# Patient Record
Sex: MALE | Race: ASIAN | NOT HISPANIC OR LATINO | Employment: FULL TIME | URBAN - METROPOLITAN AREA
[De-identification: names, ages, dates, MRNs, and addresses within clinical notes are randomized per-mention and may not be internally consistent; named-entity substitution may affect disease eponyms.]

---

## 2019-02-14 ENCOUNTER — OFFICE VISIT - HEALTHEAST (OUTPATIENT)
Dept: FAMILY MEDICINE | Facility: CLINIC | Age: 29
End: 2019-02-14

## 2019-02-14 DIAGNOSIS — Z00.00 ROUTINE GENERAL MEDICAL EXAMINATION AT A HEALTH CARE FACILITY: ICD-10-CM

## 2019-02-14 DIAGNOSIS — R63.4 WEIGHT LOSS: ICD-10-CM

## 2019-02-14 DIAGNOSIS — F34.1 DYSTHYMIA: ICD-10-CM

## 2019-02-14 DIAGNOSIS — Z13.220 LIPID SCREENING: ICD-10-CM

## 2019-02-14 LAB
CHOLEST SERPL-MCNC: 189 MG/DL
FASTING STATUS PATIENT QL REPORTED: NORMAL
HDLC SERPL-MCNC: 76 MG/DL
LDLC SERPL CALC-MCNC: 101 MG/DL
T4 FREE SERPL-MCNC: 1.3 NG/DL (ref 0.7–1.8)
TRIGL SERPL-MCNC: 62 MG/DL
TSH SERPL DL<=0.005 MIU/L-ACNC: 1.76 UIU/ML (ref 0.3–5)

## 2019-02-14 ASSESSMENT — MIFFLIN-ST. JEOR: SCORE: 1467.69

## 2019-02-15 ENCOUNTER — COMMUNICATION - HEALTHEAST (OUTPATIENT)
Dept: FAMILY MEDICINE | Facility: CLINIC | Age: 29
End: 2019-02-15

## 2019-02-19 ENCOUNTER — COMMUNICATION - HEALTHEAST (OUTPATIENT)
Dept: FAMILY MEDICINE | Facility: CLINIC | Age: 29
End: 2019-02-19

## 2019-03-04 ENCOUNTER — OFFICE VISIT - HEALTHEAST (OUTPATIENT)
Dept: BEHAVIORAL HEALTH | Facility: CLINIC | Age: 29
End: 2019-03-04

## 2019-03-04 DIAGNOSIS — F33.0 MAJOR DEPRESSIVE DISORDER, RECURRENT, MILD (H): ICD-10-CM

## 2019-03-20 ENCOUNTER — OFFICE VISIT - HEALTHEAST (OUTPATIENT)
Dept: BEHAVIORAL HEALTH | Facility: CLINIC | Age: 29
End: 2019-03-20

## 2019-03-20 DIAGNOSIS — F33.0 MAJOR DEPRESSIVE DISORDER, RECURRENT, MILD (H): ICD-10-CM

## 2019-03-21 ENCOUNTER — AMBULATORY - HEALTHEAST (OUTPATIENT)
Dept: BEHAVIORAL HEALTH | Facility: CLINIC | Age: 29
End: 2019-03-21

## 2019-03-26 ENCOUNTER — OFFICE VISIT - HEALTHEAST (OUTPATIENT)
Dept: BEHAVIORAL HEALTH | Facility: CLINIC | Age: 29
End: 2019-03-26

## 2019-03-26 ENCOUNTER — AMBULATORY - HEALTHEAST (OUTPATIENT)
Dept: BEHAVIORAL HEALTH | Facility: CLINIC | Age: 29
End: 2019-03-26

## 2019-03-26 DIAGNOSIS — F33.0 MAJOR DEPRESSIVE DISORDER, RECURRENT, MILD (H): ICD-10-CM

## 2019-04-08 ENCOUNTER — OFFICE VISIT - HEALTHEAST (OUTPATIENT)
Dept: BEHAVIORAL HEALTH | Facility: CLINIC | Age: 29
End: 2019-04-08

## 2019-04-08 DIAGNOSIS — F33.0 MAJOR DEPRESSIVE DISORDER, RECURRENT, MILD (H): ICD-10-CM

## 2019-04-30 ENCOUNTER — OFFICE VISIT - HEALTHEAST (OUTPATIENT)
Dept: BEHAVIORAL HEALTH | Facility: CLINIC | Age: 29
End: 2019-04-30

## 2019-04-30 DIAGNOSIS — F33.0 MAJOR DEPRESSIVE DISORDER, RECURRENT, MILD (H): ICD-10-CM

## 2019-05-09 ENCOUNTER — AMBULATORY - HEALTHEAST (OUTPATIENT)
Dept: BEHAVIORAL HEALTH | Facility: CLINIC | Age: 29
End: 2019-05-09

## 2019-05-14 ENCOUNTER — OFFICE VISIT - HEALTHEAST (OUTPATIENT)
Dept: BEHAVIORAL HEALTH | Facility: CLINIC | Age: 29
End: 2019-05-14

## 2019-05-14 DIAGNOSIS — F33.0 MAJOR DEPRESSIVE DISORDER, RECURRENT, MILD (H): ICD-10-CM

## 2019-06-29 DIAGNOSIS — Z11.3 SCREENING EXAMINATION FOR VENEREAL DISEASE: Primary | ICD-10-CM

## 2019-10-14 ENCOUNTER — AMBULATORY - HEALTHEAST (OUTPATIENT)
Dept: BEHAVIORAL HEALTH | Facility: CLINIC | Age: 29
End: 2019-10-14

## 2020-03-11 ENCOUNTER — HEALTH MAINTENANCE LETTER (OUTPATIENT)
Age: 30
End: 2020-03-11

## 2021-01-03 ENCOUNTER — HEALTH MAINTENANCE LETTER (OUTPATIENT)
Age: 31
End: 2021-01-03

## 2021-04-25 ENCOUNTER — HEALTH MAINTENANCE LETTER (OUTPATIENT)
Age: 31
End: 2021-04-25

## 2021-05-27 NOTE — PROGRESS NOTES
Outpatient Mental Health Treatment Plan    Name:  Leo Long  :  1990  MRN:  [id number]    Treatment Plan:  Initial Treatment Plan  Intake/initial treatment plan date:  3/26/19  Benefit and risks and alternatives have been discussed: Yes  Is this treatment appropriate with minimal intrusion/restrictions: Yes  Estimated duration of treatment:  Approximately 8-10 sessions.  Anticipated frequency of services:  Every 1-2 weeks  Necessity for frequency: This frequency is needed to establish therapeutic goals and for continuity of care in order to monitor progress.  Necessity for treatment: To address cognitive, behavioral, and/or emotional barriers in order to work toward goals and to improve quality of life.    Session Type: Patient is presenting for an Individual session.    Plan:      ? Depression    Goal:  Decrease average depression level from 3/4 to 1/4.   Strategies:    ?[x] Decrease social isolation     [x] Increase involvement in meaningful activities     ?[x] Discuss sleep hygiene     ?[x] Explore thoughts and expectations about self and others     ?[x] Process grief (loss of significant person, independence, role, etc.)     ?[x] Assess for suicide risk     ?[x] Implement physical activity routine (with physician approval)     [x] Consider introduction of bibliotherapy and/or videos     [x] Continue compliance with medical treatment plan (or explore barriers)       ?  ?Degree to which this is a problem: 3  Degree to which goal is met: goal established and in progress.  Date of Review: in 3 months.       Functional Impairment:  1=Not at all/Rarely  2=Some days  3=Most Days  4=Every Day    Personal : 3  Family : 1   Social : 2   Work/school : 2    Diagnosis:  Major depressive disorder, recurrent, mild    WHODAS 2.0 12-item version   9    Scores presented in qualifiers to represent level of disability.  MILD Problem (slight, low, ...) 5-24%    H1= 20  H2= 0  H3= 15    Clinical assessments and measures  completed:. HARPER-7, PHQ-9 and CAGE-AID and CSSR-S     Strengths:  Leo has many strengths, including intelligence, empathy, creativity, and loyalty in relationship.  Limitations:  Leo considers that he struggles with a tendency to over-think and devaluing himself.    Cultural Considerations: Leo grew up in [facility]. His childhood was impacted by a father who had serious health issues.     Persons responsible for this plan: Patient            Psychotherapist Signature           Patient Signature:              Guardian Signature             Provider: Performed and documented by JENN Koo   Date:  3/26/2019

## 2021-05-27 NOTE — PROGRESS NOTES
Mental Health Visit Note    3/26/2019    Start time: 5:15 PM    Stop Time: 6:14 PM   Session # 2    Session Type: Patient is presenting for an Individual session.    Leo Long is a 29 y.o. male is being seen today for    Chief Complaint   Patient presents with     Follow-up     Depression     grief/loss, overthinking tendency   .     New symptoms or complaints: Reported no new symptoms, nor concerns.    Functional Impairment:   Personal: 2  Family: 1  Work: 2  Social:2    Clinical assessment of mental status: Unchanged from 3/21/19: Leo presented on time for his appointment.  He was oriented x3, open and cooperative, and dressed appropriately for this session and weather. His memory represented normal cognitive functioning. His speech was within normal limits. Language was appropriate to discussion. Concentration and focus is WNL. Psychosis is not noted, nor reported. His mood is mildly dysphoric. Affect is mood-congruent. Fund of knowledge is adequate. Insight is adequate for therapy.    Suicidal/Homicidal Ideation present: None Reported This Session    Patient's impression of their current status: Patient discussed a tendency he has noticed of over-thinking and recognized some of the early experiences in his life that shaped this tendency.  He considered where he recently ruminated over his last relationship and being unfair and hard on himself.  He noticed he felt better last weekend as he tended to his cat who is having some health issues. He also considered it helped his outlook to be busier at work this week.  He explored expectation of self.  He is authoring the values that are most meaningful to him (health, relationship, healing, wellbeing, family, community, creativity) and the behaviors that serve them.    Therapist impression of patients current state: Bimal is engaging in the therapeutic process.  His thoughts, feelings and beliefs were processed. He is willing to explore and apply CBT and ACT  "strategies for improved symptom management. He has started reading a book recommended at intake, Mark Garcia' The Happiness Trap and does thought exercises between sessions.  He is also open to exploring profound spheres of influence in his life, notably his family system.  He agreed to safety planning in the presence of suicidal ideation - agreeing to reach out to reach out to his cousin, and seek emergency care if he experiences feeling unsafe/at risk of self harm.  He is insightful.      Type of psychotherapeutic technique provided: Insight oriented, Client centered, Solution-focused, CBT and ACT (Acceptance Commitment Therapy)    Progress toward short term goals:Progress as expected, patient is receptive to CBT,  ACT and mindfulness strategies for improved symptom management.    Review of long term goals: Treatment Plan updated    Diagnosis:   1. Major depressive disorder, recurrent, mild (H)        Plan and Follow up: Patient plans to nurture healthy routines, including physical activity and nurturing a diaphragmatic breathing/mindfulness practice.  He plans to notice his thoughts and feelings with a spirit of curiosity and acceptance, as well as, apply ACT thought defusion techniques in the presence of aversive thoughts and feelings.  He also plans to situations when he is over-thinking and what feelings he may be protecting himself from feeling. He plans to do an ACT thought exercise, \"Getting Unhooked.\"  He plans to continue to read Mark Garcia \"The Happiness Trap.\"  He plans to attend an artistic event (identified as an interest - carryover goal).  Agrees to safety planning in the presence of self destructive thoughts.  He plans to return  for follow up in 2 weeks.      Discharge Criteria/Planning: Patient will continue with follow-up until therapy can be discontinued without return of signs and symptoms.    Juliet Johnson 3/26/2019  "

## 2021-05-27 NOTE — PROGRESS NOTES
"Mental Health Visit Note    4/8/2019    Start time: 5:06 PM    Stop Time: 6:05 PM   Session # 3    Session Type: Patient is presenting for an Individual session.    Leo Long is a 29 y.o. male is being seen today for    Chief Complaint   Patient presents with     Follow-up     Depression     worry/concern for loved one   .     New symptoms or complaints: None    Functional Impairment:   Personal: 2  Family: 2  Work: 1  Social:2    Clinical assessment of mental status: Leo presented on time for his appointment.  He was oriented x3, open and cooperative, and dressed appropriately for this session and weather. His memory represented normal cognitive functioning. His speech was within normal limits. Language was appropriate to discussion. Concentration and focus is WNL. Psychosis is not noted, nor reported. His mood is euthymic. Affect is mood-congruent. Fund of knowledge is adequate. Insight is adequate for therapy.    Suicidal/Homicidal Ideation present: None Reported This Session    Patient's impression of their current status: Patient stated, \"I've been doing pretty good, been feeling more content,\" as he elaborated on a new dating relationship that has been a source of lenny.  He recognized traits in the person he has started dating that he admires and resonates with, including open-mindedness, intelligence, and skilled communicator.  He considered that work has been better lately because he has been busier.  He stated, \"I haven't noticed over-thinking things lately.\"  He discussed efforts he is making to engage in healthy activities - including fitness and his photography hobby.  He discussed concern for his cousin who is struggling with mental health issues.  He considered ways he can continue to be supportive toward him.  He explored expectation of self.  He is authoring the values that are most meaningful to him (health, relationship, wellbeing, work, family, creativity) and the behaviors that serve " "them.    Therapist impression of patients current state: Bimal is engaging in the therapeutic process.  His thoughts, feelings and beliefs were processed. He appeared less dysphoric in today's session and appears to be benefiting from engaging in healthy routines, including engaging in value-based activities. He also remains open to exploring and applying CBT and ACT strategies for improved symptom management. He is reading aMrk Garcia' The Happiness Trap and does thought exercises between sessions.  He is receptive to exploring profound spheres of influence in his life. He is insightful.      Type of psychotherapeutic technique provided: Insight oriented, Client centered, Solution-focused, CBT and ACT (Acceptance Commitment Therapy)    Progress toward short term goals:Progress as expected, patient is receptive to CBT,  ACT and mindfulness strategies for improved symptom management.    Review of long term goals: Patient is making progres on his long-term goals.  Treatment Plan was completed on 3/26/19.    Diagnosis:   1. Major depressive disorder, recurrent, mild (H)        Plan and Follow up: Patient plans to nurture healthy routines, including physical activity and nurturing a diaphragmatic breathing/mindfulness practice.  He plans to continue to notice his thoughts and feelings with a spirit of curiosity and acceptance, as well as, apply ACT thought defusion techniques in the presence of aversive thoughts and feelings.  He also plans to situations when he is over-thinking and what feelings he may be protecting himself from feeling. He plans to continue to read Mark Garcia \"The Happiness Trap.\"  Agrees to safety planning in the presence of self destructive thoughts.  He plans to return  for follow up in 3 weeks.      Discharge Criteria/Planning: Patient will continue with follow-up until therapy can be discontinued without return of signs and symptoms.    Juliet Johnson 4/8/2019  "

## 2021-05-28 NOTE — PROGRESS NOTES
Returned call from patient with request to schedule a follow up.  He requested a late in the day appointment due to his work schedule.  Provided a 5PM follow up appointment on Tuesday, 5/14/19.  He expressed appreciation.  Wished him well.

## 2021-05-28 NOTE — PROGRESS NOTES
"Mental Health Visit Note    4/30/2019    Start time: 5:04 PM    Stop Time: 5:59 PM   Session # 4    Session Type: Patient is presenting for an Individual session.    Leo Long is a 29 y.o. male is being seen today for    Chief Complaint   Patient presents with     Follow-up     Anxiety     relationship issues, job stress   .     New symptoms or complaints: Patient reported no new symptoms, nor concerns.    Functional Impairment:   Personal: 2  Family: 1  Work: 1  Social:2    Clinical assessment of mental status: Leo presented on time for his appointment.  He was oriented x3, open and cooperative, and dressed appropriately for this session and weather. His memory represented normal cognitive functioning. His speech was within normal limits. Language was appropriate to discussion. Concentration and focus is WNL. Psychosis is not noted, nor reported. His mood is euthymic. Affect is mood-congruent. Fund of knowledge is adequate. Insight is adequate for therapy.    Suicidal/Homicidal Ideation present: None Reported This Session    Patient's impression of their current status: Patient stated, \"I feel pretty good,\" as he elaborated on a stressful work situation improving and continuing to enjoy a new dating relationship.  He described feeling somewhat nervous about meeting new people as his girlfriend has been introducing him to her friends.  He considered where he may struggle with being over-focused on how he is being perceived by others, especially meeting new people.  He acknowledged feeling generally better these last couple months, and considered the positive influence of meeting someone he likes and his work situation gradually improving.  He discussed concern for his friend who is having a difficult time adjusting to a medication.  He considered ways he can continue to be supportive toward him without feeling responsible.  He explored expectation of self and other.  He is authoring the values that are most " "meaningful to him (health, relationship, wellbeing, rewarding work, empathy, creativity) and the behaviors that serve them.    Therapist impression of patients current state: Bimal is engaging in the therapeutic process.  His thoughts, feelings and beliefs were processed. He appears with an improved mood in recent sessions.  He appears to be benefiting from nurturing healthy routines, including using [employer] keisha. He is exploring and applying CBT and ACT strategies for improved symptom management in and out of sessions.  He has experienced suicidal ideation in a couple months, and agrees to safety plan of reaching out to his cousin and/or friend in the presence of self destructive thoughts, and seeking emergency care if he experiences feeling unsafe toward self.  He is enjoying a new relationship, a source of connection and lenny. He is insightful.      Type of psychotherapeutic technique provided: Insight oriented, Client centered, Solution-focused, CBT and ACT (Acceptance Commitment Therapy)    Progress toward short term goals:Progress as expected, patient is making excellent progress on his short-term goals.     Review of long term goals: Patient is making progres on his long-term goals.  Treatment Plan was completed on 3/26/19.    Diagnosis:   1. Major depressive disorder, recurrent, mild (H)        Plan and Follow up: Patient plans to continue to nurture healthy routines, including physical activity and nurturing a diaphragmatic mindfulness practice.  He plans to continue to notice his thoughts and feelings with a spirit of curiosity and acceptance, as well as, apply ACT thought defusion techniques in the presence of aversive thoughts and feelings.  He also plans to continue to notice when he is over-thinking and what feelings he may be protecting himself from feeling in those situations.  Recommended writing in a journal as an outlet for thoughts and feelings. He plans to finish reading Mark Garcia \"The " "Happiness Trap.\"  Agrees to safety planning in the presence of self destructive thoughts.  He plans to return  for follow up next month, or as needed.  He shared that he has a limited number of therapy sessions covered by his insurance and he plans to schedule a follow up when he notices he is struggling more with symptoms.         Discharge Criteria/Planning: Patient will continue with follow-up until therapy can be discontinued without return of signs and symptoms.    Juliet Johnson 4/30/2019  "

## 2021-05-28 NOTE — PROGRESS NOTES
"Mental Health Visit Note    5/14/2019    Start time: 5:05 PM    Stop Time: 6:01 PM   Session # 5    Session Type: Patient is presenting for an Individual session.    Leo Long is a 29 y.o. male is being seen today for    Chief Complaint   Patient presents with     Follow-up     Depression     grief/loss issues   .     New symptoms or complaints: None    Functional Impairment:   Personal: 2  Family: 1  Work: 1  Social:2    Clinical assessment of mental status: Leo presented on time for his appointment.  He was oriented x3, open and cooperative, and dressed appropriately for this session and weather. His memory represented normal cognitive functioning. His speech was within normal limits. Language was appropriate to discussion. Concentration and focus is WNL. Psychosis is not noted, nor reported. His mood is mildly dysphoric. Affect is euthymic. Fund of knowledge is adequate. Insight is adequate for therapy.    Suicidal/Homicidal Ideation present: None Reported This Session    Patient's impression of their current status: Patient discussed a recent relationship break up, something he acknowledged he has spent a lot of time \"reminiscing about the good times\" in his thoughts.  He stated, \"Today might be the best day since last week,\" as he elaborated on feeling more hopeful.  He acknowledged he has connected with a group of men who play board games, and intends to continue to connect with them even though they are also friends with his ex. He has also been spending time with other friends and coworkers. He has started running again, as well.  He recognized the ways he can continue to nurture healthy routines to help him not get stuck in his feelings.  He explored expectation of self and other.  He is authoring the values that are most meaningful to him (health, relationship, meaningful connection with others, wellbeing, rewarding work, empathy, creativity) and the behaviors that serve them.    Therapist " "impression of patients current state: Bimal is engaging in the therapeutic process.  His thoughts, feelings and beliefs were processed. He continues to benefit from nurturing healthy routines, which is helping him navigate grief feelings in the context of his recent relationship break up. He is open to exploring and applying CBT and ACT strategies for improved symptom management in and out of sessions.  He has not experienced suicidal ideation in a couple months, and agrees to safety plan of reaching out to his cousin and/or friend in the presence of self destructive thoughts, and seeking emergency care if he experiences feeling unsafe toward self.  He is insightful.      Type of psychotherapeutic technique provided: Insight oriented, Client centered, Solution-focused, CBT and ACT (Acceptance Commitment Therapy)    Progress toward short term goals:Progress as expected, patient is making excellent progress on his short-term goals.     Review of long term goals: Patient is making progres on his long-term goals.  Treatment Plan was completed on 3/26/19.    Diagnosis:   1. Major depressive disorder, recurrent, mild (H)        Plan and Follow up: Patient plans to continue to nurture healthy routines, including physical activity, connecting with others, and nurturing a diaphragmatic mindfulness practice.  He plans to continue to notice his thoughts and feelings with a spirit of curiosity and acceptance, and, apply ACT thought defusion techniques in the presence of aversive/unhelpful thoughts and feelings.  He also plans to continue to notice when he is over-thinking, and engage in a healthy behavior as a distraction/to help him regain persective.  He plans to finish reading Mark Garcia \"The Happiness Trap.\"  Agrees to safety planning in the presence of self destructive thoughts.  He plans to return  for follow up in 2 weeks, or as needed.      Discharge Criteria/Planning: Patient will continue with follow-up until therapy " can be discontinued without return of signs and symptoms.    Juliet Johnson 5/14/2019

## 2021-06-02 VITALS — WEIGHT: 120.4 LBS | BODY MASS INDEX: 18.25 KG/M2 | HEIGHT: 68 IN

## 2021-06-02 NOTE — PROGRESS NOTES
Contacted patient at number in epic, [phone], and left discrete message with encouragement to return call to discuss scheduling, and wished him well.    Plan: assist patient with scheduling follow up, as needed.

## 2021-06-24 NOTE — PATIENT INSTRUCTIONS - HE
Check into insurance coverage of full spectrum light    If you have a date for tetanus vaccine , please let us know

## 2021-06-24 NOTE — PROGRESS NOTES
"Diagnostic Assessment    [] Brief  ?[x] Standard    Date(s): 2019  Start Time: 8:04 AM  Stop Time: 9:25 AM    Patient Name: Leo Long  Age: 29 y.o.       1990    Referral Source: PCP, Dr. Kesha Silva MD  Therapist: RHONDA Hernandez, VICKI                                                                                    Persons Present: Leo Ethan and VICKI Hernandez     Session Type: Patient is presenting for an Individual session.       Chief Complaint  \"I'm making my way through a depressive episode. I've been kind of up and down the last 3 weeks.\"  Patient acknowledged a history of depression.     Patient s expectation for treatment   \"Better coping strategies to minimize effects of depressive episodes, especially during relationship challenges.\"  He added, \"How to not over-think things, especially in relationship.\"     Sources/references used in completing this assessment:   Face-to-face interview, review of patient's chart, adult intake questionnaire, and psychological inventories.    Presenting Problem/History:    Functional impairments:   Personal: 3  Family: 1  Work: 2  Social: 2     How does the presenting problem affect patients daily functioning:  Over-thinking, rumination, concentration difficulty, sometimes forgetful, difficulty falling asleep, diminished appetite, occasionally feeling \"an overwhelming sadness.\"     Issues/Stressors: Recent relationship break-up, winter, moved to [place] in 2018, miss friends and family in [place], stressful work environment (in the sense that there are slow periods at work where he over-thinks).      Please select all that apply:    Physical Problems: Sleeping too much, Decreased energy and Decreased appitite    Social Problems: Communications problems, Distrust of others, Unstable relationships, Loss of interest in activities and Sexual difficulties    Behavioral Problems: Reckless and Subtance abuse    Cognitive Problems: " Distractability, Poor attention, Indecisiveness, Poor memory, Racing thoughts and Procrastination    Emotional Problems: Apathetic, Depressed mood and Lack of self confidence       Onset/Frequency/Duration presenting problem symptoms:  Approximately 3 years with depressive episodes occurring in winter and occasionally in summer months.    How does the patient perceive his problem in relation to how others see his problem?  He considered that his closest friend and cousin know him well and are aware when he struggles with depressive episodes.  He considers that his coworkers are aware, and he acknowledges being open on social media about his experience as it can be a source of support from others - he also mentioned a desire to reduce stigma of mental health.     Family/Social History:    Marriages/Significant other   Currently single.  Recently experienced a break up with his girlfriend of 3 months.      Children   Reported none.    Parents   Parents were  about 35 years. His father  in .  His mother lives in [place].      Siblings   Reported no siblings.    Climate in family of origin   Bimal grew up in the [facility].  He was an only child.  He spent a lot of time alone. His father was frequently hospitalized with health issues related to heart disease.  He remembers spending a great deal of time in hospital cafeterias after school, then, as he became a little older, he would often be home alone at the end of the school day.  He described his father as strict and was not close with him.  He was closer to his mother. However, neither parents showed much affection toward him.     Education  Master's degree in Clinical Psychology.      Problems with Learning or School   Reported experiencing depression as a sophomore in high school which impacted his grades.    Developmental factors  Bimal met his developmental milestones.    Significant personal relationships including patient s evaluation of the  relationship quality  Bimal identified having close, long-standing friendships and being close to his cousin - all of whom live in his native [place].  He also has a couple friends in [place] who he has come to know through his graduate program.    Significant life events  Bimal reflected on an experience of being in the 5th grade and a party being planned for his dad's 50th birthday.  He was not invited to attend, and spent the night at his cousin's.  He remembers that his parents did not discuss this with him and he did not talk about his feelings with them.  He also reflected on a relationship break up during a semester in college which had a significant impact on him.  He had thought that everything was going fine and his girlfriend abruptly cut off contact with him.  As he looks back on the experience, he considers that he was 'too needy.'      Sexual/physical/emotional/financial abuse/traumatic event. Reported none.    PTSD Symptoms  No        Contextual Non-personal factors contributing to the patients concerns  Reported none.    Strengths/personal resources  Empathetic, creative, independent, loyal in relationship.    Weaknesses   Lacking confidence, devaluing of self, tendency to over-think, overly conscientious.     Support network(s)/Resources    Reported no formal support resources.     Belief system:    Reported none.    Cultural influences and impact on patient   Bimal reflected on growing up with a father who struggled with serious health issues.  He saw that his father was not afraid of dying, which has shaped his view of mortality.  He also reflected on his father's life shaping influences as he was born in [place] and sent to boarding school in [place], spending much of his childhood  from his parents, and how this impacted his attachment style as a parent.      Cultural impact on health and health care  Seeks and adheres to standard medical care.      Current living situation  Lives  "in a 1-bedroom apartment.  Has a pet cat.    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     Financial Concerns   Reported none.    Legal Problems   Reported none.    Hobbies/Interests:    Photography, video games, social drinking.      Family Mental Health/Medical History:    Family Mental Health:   Reported none.    Family history of Suicide:  Reported none.    Family history Chemical Dependency:  Alcohol abuse - aunt.    Family Medical history:  Diabetes, heart disease, high blood pressure, high cholesterol - father.      Patient Medical History:      Hospitalizations    Reported none.     Medical diagnoses/concerns:  Reported none.    Current physician/other non psychiatric medical provider's:    Kesha Silva MD    Date of last medical exam:  2/14/19    Current Medications:   No current outpatient medications on file prior to visit.     No current facility-administered medications on file prior to visit.          Past Mental Health History:    Previous mental health diagnosis:  Reported none.    Date of diagnosis:  Reported none.    Hx of Mental Health Treatment or Services:  None.    [facility] Received:     No      Hx of MH Tx/Hospitalizations   Reported none.    Hx of Psychiatric Medications:  Reported none.      Suicidal/Homicidal Risk Assessment:    Suicidal: Intent: low  Ideation:Passive He reports a long history of passive suicidal ideation.  He has had the thought a couple weeks ago while driving in traffic \"What if I got out of the car and ran over the bridge,\" with thoughts about whether he would hit ice and how persons in traffic would respond.  He stated, \"I wouldn't do it.\"  He recalls the first time he had a suicidal thought as a sophomore in high school in about 2005 when he considered cutting his wrist in the context of a conflict with his father who refused to allow him to keep a gift his mother had purchased for him " because of his views on computer games.   History of Past Attempt(s): description: Reported no history of attempts.   Crisis Plan: Patient agrees to safety plan and commits to seeking safety if he becomes unsafe in the presence of self destructive thoughts and urges.  He has an agreement with his cousin who he is close to, to talk to him.    Homicidal: None reported   Ideation:Reported none.  History of Aggression towards others: Reported none.  Crisis Plan: Crisis Plan not developed as patient does not present risk factors.    History of destruction to property:  Description: Reported none.  Crisis Plan: Reported none.    Non- Substance Abuse Addictive Behaviors/Compulsive Behaviors:  None Reported    Comments:   Reported none.    Chemical Use/Abuse History:    CAGE-AID (screening to determine a patients use/abuse/dependency):   2/4      Alcohol:  Yes  Type:Beer, whiskey               Frequency: Weekly, approximately 6 drinks/week  Age of first use: 15                        Date of last use: 2/18/19                                                                          Street Drugs:  Yes  Type:marijuana edibles               Frequency: Occasionally, 5 mg THC  Age of first use: 24                         Date of last use: 2/23/19    Prescription Drugs:  None Reported  Type: No history of prescription drug abuse.                 Tobacco:  None Reported  Type:No history of tobacco use.                 Caffeine:  Yes  Type: Coffee, tea, caffeine pills               Frequency: Daily, up to 200 mg  Age of first use: 18, regularly by age 26                         Date of last use: 3/1/19    Currently in a treatment program: No     Where: No history of substance abuse treatment.    [facility] Received: No          Collaborative info requested/received: No       Comments: Bimal reported a history of binge drinking in college.  He indicated he has stopped drinking on 2/18/19 after noticing his drinking had increased in  "January.      History of CD Treatment:      None reported             Description: No history of CD Treatment.    Mental Status Evaluation:    Grooming: Meticulous  Attire: Appropriate  Age: Appears Stated  Behavior Towards Examiner: Cooperative  Motor Activity: Within normal   Eye Contact: Appropriate  Mood: Depressed  Affect: Congruent w/content of speech  Speech/Language: Within normal  Attention: Within normal  Concentration: Within normal  Thought Process: Within normal  Thought Content: Within noramlWithin normal  Orientation: X 3No Evidence of Impairment  Memory: No Evidence of Impairment  Judgement: No Evidence of Impairment  Estimated Intelligence: Above Average  Demonstrated Insight: Adequate  Fund of Knowledge: adequate       Clinical Impressions/Assessment/Recommendations:     Leo \"Bimal\"Ethan provided background information via the Adult Intake Questionnaire, psychological measures and face-to-face interview. [facility] medical records were consulted to complete this DA. The patient was referred to this therapist by his primary care doctor, Dr. Kesha Silva on 2/14/19.     Bimal is a 29 y.o. male presenting to therapy for assistance with depression and anxiety symptoms. Bimal reports a history of depression dating back to adolescence.  He considered that there may be a seasonal effect with some of his depressive episodes. He has also correlated two episodes with relationship break ups, including his current one as he has recently experienced the end of a dating relationship.  He stated, \"I'm making my way through a depressive episode. I've been kind of up and down the last 3 weeks.\" Bimal has attempted to eliminate or manage his symptoms by experiential avoidance and expresses an interest in engaging in psychotherapy at this time with a goal of learning \"better coping strategies to minimize effects of depressive episodes, especially during relationship challenges.\"     Social stressors: Recent ending of " "a dating relationship, stressful work environment (\"leaves me a lot of time to over-think\"), recent move to [place] (Oct. 2018), miss friends and family, minimal local support system.  Bimal presents important strengths: intelligence, history of academic achievement, stable employment in his field, and long-standing friendships.    Based on the information gathered in this diagnostic assessment, the patient meets criteria for the following DSM-V diagnosis, Major Depressive disorder, recurrent, mild as supported by his experience of the following symptoms which have been present during the same 2-week period and represent a change from previous functioning: depressed mood, anhedonia, sleep disturbance (difficulty falling asleep), diminished ability to concentrate (characterized by distractibility, attention issues, and indecisiveness), and recurrent suicidal ideation without a specific plan. The symptoms cause clinically significant distress and are not attributable to the physiological effects of a substance or another medical condition.    Diagnosis:  Major Depressive Disorder, recurrent, mild    It is recommended that Bimal begin individual psychotherapy with follow-up appointments scheduled weekly or bi-weekly.      Bimal would be best serviced by therapeutic interventions that provide a client centered atmosphere with positive regard. In addition, he may benefit from cognitive behavioral therapies, along with Motivational Interviewing.    WHODAS 2.0 12-item version: 9 (18.75%)    Scores presented in qualifiers to represent level of disability.  MILD Problem (slight, low, ...) 5-24%    H1= 20  H2= 0  H3= 15    Assessment of client resolving presenting mental health concerns:    Ability: average. His work hours: 8-4:30 PM present a barrier.  Provider opened up a couple 5PM appointments for follow up.   Motivation: high  Willingness: high      Initial Therapy Plan    1. Return to therapy to discuss outcome of " diagnostic assessment and create a treatment plan.    2. Develop therapeutic relationship with therapist.     3. CBT and other Mindfulness-based approaches, including Acceptance Commitment Therapy (ACT) for depression symptoms.    4. Motivational Interviewing to increase patient's therapeutic engagement and evoke motivation to make positive change.     Is patient's family involved in the treatment?  No     If yes, How?  Bimal is interested in individual therapy sessions.    If no, Why?  If Bimal wishes to include a loved one in future sessions, his change in preference will be honored.

## 2021-06-24 NOTE — PROGRESS NOTES
MALE PREVENTATIVE EXAM    Assessment and Plan:       1. Routine general medical examination at a health care facility  Generally well man    2. Dysthymia  Has good self care, but this is not enough  - Ambulatory referral to Psychology    3. Weight loss  Rule out thyroid disorder  - Thyroid Stimulating Hormone (TSH)  - T4, Free    4. Lipid screening  - Lipid Dawson FASTING        Next follow up:  Return in about 1 year (around 2/14/2020) for Annual physical, or earlier as needed.    Immunization Review  Adult Imm Review: No immunizations due today      I discussed the following with the patient:   Adult Healthy Living: Healthy nutrition  Getting adequate sleep  Stress management  I have had an Advance Directives discussion with the patient.   High protein snacks on hand for low blood sugar    Subjective:   Chief Complaint: Leo Long is an 29 y.o. male here for a preventative health visit.     HPI:  Leo is originally from [place] and came her to attend [employer].  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    We took time to review his history    1) food allergies: Walnuts when younger - ichthy throat - can probably eat now.  He also has oral allergy symptoms with cantaloupe, avocado , pineapple    2) symptoms of low blood sugar: For the last couple of years has found that blood sugar gets low easily  - 3-4 hours after a mal feels weak and shaky - keeps a vial of glucose tablet on me - trying to focus on eating snacks throughout the day.  Had thyroid checked over 10 years ago    3) Has a hard time gaining weight - over the past 1 - 1.5 years he has lost weight - eating less because he was in school and exercising more    4) Depression - comes and goes - notices that it usually - for last 3 years - falling in line with the seasons.  This year started up a month ago and comes and goes.  Interested in psychotherapy, No childhood  abuse, not close to parents. He was isolated/ left alone frequently because Dad had medical needs and had to go out to rehab - or home alone since 5th grade.    He is starting to do mindfulness practice.  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 No  Do you typically exercising at least 40 min, 3-4 times per week?  Yes - 3x/week - body weights - curls, shoulder press, squats, push ups, pull ups  Do you usually eat at least 4 servings of fruit and vegetables a day, include whole grains and fiber and avoid regularly eating high fat foods? NO  - working on that  Have you had an eye exam in the past two years? Yes  Do you see a dentist twice per year? NO  - working on it  Do you have any concerns regarding sleep? No    Safety Screen  If you own firearms, are they secured in a locked gun cabinet or with trigger locks? The patient does not own any firearms  Do you feel you are safe where you are living?: Yes (2/14/2019  9:04 AM)  Do you feel you are safe in your relationship(s)?: Yes (2/14/2019  9:04 AM)      Review of Systems:    Constitutional: negative for recent illness or change in weight; low body weight  Eyes: negative for irritation and vision change  Ears, nose, mouth, throat, and face: negative for nasal congestion and sore throat  Respiratory: negative for cough and dyspnea on exertion  Cardiovascular: negative for chest pain and palpitations  Gastrointestinal: negative for abdominal pain and change in bowel habits  Genitourinary:negative for dysuria, frequency and hesitancy  Integument/breast: negative for rash  Hematologic/lymphatic: negative for bleeding and easy bruising  Musculoskeletal:negative for arthralgias and  "myalgias  Neurological: negative for dizziness, headaches and paresthesia      Cancer Screening     Patient has no health maintenance due at this time            History     Reviewed By Date/Time Sections Reviewed    Kesha Silva MD 2/14/2019  9:36 AM Tobacco, Alcohol, Drug Use, Sexual Activity    Kesha Silva MD 2/14/2019  9:30 AM Medical    Iliana Burton CMA 2/14/2019  9:03 AM Tobacco, Alcohol, Drug Use, Sexual Activity            Objective:   Vital Signs:   Visit Vitals  /70 (Patient Site: Right Arm, Patient Position: Sitting, Cuff Size: Adult Regular)   Pulse 78   Ht 5' 7.5\" (1.715 m)   Wt 120 lb 6.4 oz (54.6 kg)   SpO2 98%   BMI 18.58 kg/m           PHYSICAL EXAM  General appearance - alert, well appearing, and in no distress and thin  Mental status - normal mood though seems cautious; normal behavior, speech, dress, motor activity, and thought processes  Eyes - funduscopic exam normal, discs flat and sharp  Ears - bilateral TM's and external ear canals normal  Mouth - mucous membranes moist, pharynx normal without lesions  Neck - supple, no significant adenopathy, carotids upstroke normal bilaterally, no bruits, thyroid exam: thyroid is normal in size without nodules or tenderness  Chest - clear to auscultation, no wheezes, rales or rhonchi, symmetric air entry  Heart - normal rate, regular rhythm, normal S1, S2, no murmurs, rubs, clicks or gallops  Abdomen - soft, nontender, nondistended, no masses or organomegaly  Neurological - DTR's normal and symmetric  Extremities - peripheral pulses normal, no pedal edema, no clubbing or cyanosis  Skin - no rashes or worrisome lesions        "

## 2021-06-25 NOTE — PROGRESS NOTES
"Mental Health Visit Note    3/20/2019    Start time: 5:00 PM    Stop Time: 5:59 PM   Session # 1    Session Type: Patient is presenting for an Individual session.    Leo Long is a 29 y.o. male is being seen today for    Chief Complaint   Patient presents with     Follow-up     Depression     grief/loss issues,  loneliness/sadness   .     New symptoms or complaints: Reported no new symptoms, nor concerns.    Functional Impairment:   Personal: 2  Family: 1  Work: 2  Social:2    Clinical assessment of mental status: Leo presented on time for his appointment.  He was oriented x3, open and cooperative, and dressed appropriately for this session and weather. His memory represented normal cognitive functioning. His speech was within normal limits. Language was appropriate to discussion. Concentration and focus is WNL. Psychosis is not noted, nor reported. His mood is mildly dysphoric. Affect is mood-congruent. Fund of knowledge is adequate. Insight is adequate for therapy.    Suicidal/Homicidal Ideation present: Yes: reported occasional passive suicidal ideation, denied planning.  No specificity to thoughts.  Described as having questioned, \"is life worth living?\" He found it helpful to engage in hobby activity, a helpful distraction.    Patient's impression of their current status: Patient described feeling hopeless last week, a feeling that he could correlate with feeling sad about the ending of a relationship in January.  He acknowledged that he had an improved outlook last weekend and was looking forward to going on a date, although when his date cancelled, he recognized struggling with thoughts about being alone \"forever.\"  He discussed the challenges of living at a great distance from his family and friends. He considered also the friendships he has made through his work are with persons who tend to visit with their respective families on weekends.  He reflected on feeling loneliness for several years and " considered times in his life where he felt less lonely, such as during his first years of college.  He considered the importance of community and relationship.  He recognizes having an introverted personality and having hobbies and interests that tend to be practiced solitary.  He considered the question of how to build greater connection with others.  He is authoring the values that are most meaningful to him (relationship, health, wellbeing, family, community, creativity) and the behaviors that serve them.    Therapist impression of patients current state: Bimal is beginning to engage in the therapeutic process.  His thoughts, feelings and beliefs were processed. He appeared less dysphoric than during intake session.  He is willing to explore CBT and ACT strategies for improved symptom management. He has started reading a book recommended at intake, Mark Jose' The Happiness Trap.  He is also open to exploring profound spheres of influence in his life, notably his family system.  He agreed to safety planning in the presence of suicidal ideation - agreeing to reach out to reach out to his cousin, and seek emergency care if he experiences feeling unsafe/at risk of self harm.  He is insightful.      Type of psychotherapeutic technique provided: Insight oriented, Client centered, Solution-focused, CBT and ACT (Acceptance Commitment Therapy)    Progress toward short term goals:Progress as expected, patient is receptive to CBT,  ACT and mindfulness strategies for improved symptom management.    Review of long term goals: will complete Treatment Plan at next follow up visit.    Diagnosis:   1. Major depressive disorder, recurrent, mild (H)        Plan and Follow up: Patient plans to nurture healthy routines, including physical activity and nurturing a diaphragmatic breathing practice.  He plans to begin to notice his thoughts and feelings with a spirit of curiosity and acceptance, as well as, apply ACT thought defusion  "techniques in the presence of aversive thoughts and feelings.  He plans to continue to read Mark Garcia \"The Happiness Trap.\"  He plans to attend an artistic event (identified as an interest).  Agrees to safety planning in the presence of self destructive thoughts.  He plans to return next week for follow up.      Discharge Criteria/Planning: Patient will continue with follow-up until therapy can be discontinued without return of signs and symptoms.    Juliet Johnson 3/20/2019  "

## 2021-10-10 ENCOUNTER — HEALTH MAINTENANCE LETTER (OUTPATIENT)
Age: 31
End: 2021-10-10

## 2022-05-17 ENCOUNTER — OFFICE VISIT (OUTPATIENT)
Dept: FAMILY MEDICINE | Facility: CLINIC | Age: 32
End: 2022-05-17
Payer: COMMERCIAL

## 2022-05-17 VITALS
WEIGHT: 134.3 LBS | RESPIRATION RATE: 16 BRPM | SYSTOLIC BLOOD PRESSURE: 126 MMHG | HEIGHT: 67 IN | TEMPERATURE: 97.9 F | OXYGEN SATURATION: 99 % | BODY MASS INDEX: 21.08 KG/M2 | DIASTOLIC BLOOD PRESSURE: 66 MMHG | HEART RATE: 62 BPM

## 2022-05-17 DIAGNOSIS — Z11.59 NEED FOR HEPATITIS C SCREENING TEST: ICD-10-CM

## 2022-05-17 DIAGNOSIS — Z13.220 SCREENING, LIPID: ICD-10-CM

## 2022-05-17 DIAGNOSIS — Z00.00 ANNUAL PHYSICAL EXAM: Primary | ICD-10-CM

## 2022-05-17 LAB
CHOLEST SERPL-MCNC: 218 MG/DL
FASTING STATUS PATIENT QL REPORTED: YES
HDLC SERPL-MCNC: 74 MG/DL
LDLC SERPL CALC-MCNC: 122 MG/DL
TRIGL SERPL-MCNC: 110 MG/DL

## 2022-05-17 PROCEDURE — 36415 COLL VENOUS BLD VENIPUNCTURE: CPT | Performed by: FAMILY MEDICINE

## 2022-05-17 PROCEDURE — 86803 HEPATITIS C AB TEST: CPT | Performed by: FAMILY MEDICINE

## 2022-05-17 PROCEDURE — 90471 IMMUNIZATION ADMIN: CPT | Performed by: FAMILY MEDICINE

## 2022-05-17 PROCEDURE — 90715 TDAP VACCINE 7 YRS/> IM: CPT | Performed by: FAMILY MEDICINE

## 2022-05-17 PROCEDURE — 99395 PREV VISIT EST AGE 18-39: CPT | Mod: 25 | Performed by: FAMILY MEDICINE

## 2022-05-17 PROCEDURE — 80061 LIPID PANEL: CPT | Performed by: FAMILY MEDICINE

## 2022-05-17 NOTE — PROGRESS NOTES
"  ASSESSMENT/PLAN:   Leo was seen today for physical and recheck medication.    Diagnoses and all orders for this visit:    Annual physical exam    Need for hepatitis C screening test  -     Hepatitis C Screen Reflex to HCV RNA Quant and Genotype    Screening, lipid  -     Lipid panel reflex to direct LDL Fasting    Other orders  -     REVIEW OF HEALTH MAINTENANCE PROTOCOL ORDERS  -     TDAP VACCINE (Adacel, Boostrix)            COUNSELING:   Reviewed preventive health counseling, as reflected in patient instructions  Special attention given to:        Regular fiber use - however if this does not help with occasional BRBPR, would refer to colon and rectal surgery assocaites    Estimated body mass index is 21.03 kg/m  as calculated from the following:    Height as of this encounter: 1.702 m (5' 7\").    Weight as of this encounter: 60.9 kg (134 lb 4.8 oz).       He reports that he has never smoked. He has never used smokeless tobacco.      Kesha Silva MD  [facility]    SUBJECTIVE:   CC: Leo Long is an 32 year old male who presents for preventative health visit.       Patient has been advised of split billing requirements and indicates understanding: Yes  Healthy Habits:     Getting at least 3 servings of Calcium per day:  Yes    Bi-annual eye exam:  Yes    Dental care twice a year:  NO    Sleep apnea or symptoms of sleep apnea:  None    Diet:  Regular (no restrictions) and Breakfast skipped    Frequency of exercise:  2-3 days/week    Duration of exercise:  30-45 minutes    Taking medications regularly:  Yes    Medication side effects:  Not applicable    PHQ-2 Total Score: 0    Additional concerns today:  Yes    Exercise : on and off - a few weeks where he works out, lifts weight - they have a pelleton  Diet - regular    History of depression - feeling much better now. Drinking less. Met current partner during pandemic  - this brings him happiness, they are living " "together - just had 2 year anniversary 2 weeks ago    BRBPR: a month and a half ago he was going to the bathroom and there was a lot of blood with bowel movement.  Didn't hurt and it was dripping. Occurred two days, then went away.  No issues since, no bleeding.  Takes the target version of metamucil occasionally.  Stools ave been on the softer side - and has to push harder because of that.  No family history of colon or rectal issues. Mom had colon polyps - not a concern. No abdominal pain or weight loss    Also over the past two years he has notices that after he has a bowel movement, he gets up and a minute or two later has a couple of drops of pee.  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 Free genetic information on health related traits, ancestry and even medications with genetics .  0 they will take all .  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  Discussed this is not transmissible between people, so may not have been required  ---      Today's PHQ-2 Score:   PHQ-2 ( 1999 Pfizer) 5/16/2022   Q1: Little interest or pleasure in doing things 0   Q2: Feeling down, depressed or hopeless 0   PHQ-2 Score 0   Q1: Little interest or pleasure in doing things Not at all   Q2: Feeling down, depressed or hopeless Not at all   PHQ-2 Score 0       Abuse: Current or Past(Physical, Sexual or Emotional)- No  Do you feel safe in your environment? Yes        Social History     Tobacco Use     Smoking status: Never Smoker " "    Smokeless tobacco: Never Used   Substance Use Topics     Alcohol use: Yes     If you drink alcohol do you typically have >3 drinks per day or >7 drinks per week? Yes      Alcohol Use 5/17/2022   Prescreen: >3 drinks/day or >7 drinks/week? -   Prescreen: >3 drinks/day or >7 drinks/week? Yes       Reviewed orders with patient. Reviewed health maintenance and updated orders accordingly - Yes    Reviewed and updated as needed this visit by clinical staff   Tobacco                  Reviewed and updated as needed this visit by Provider                     Review of Systems  Constitutional: negative for recent illness or change in weight  Eyes: negative for irritation and vision change  Ears, nose, mouth, throat, and face: negative for nasal congestion and sore throat  Respiratory: negative for cough and dyspnea on exertion  Cardiovascular: negative for chest pain and palpitations  Gastrointestinal: negative for abdominal pain and change in bowel habits  Genitourinary:negative for dysuria, frequency and hesitancy  Integument/breast: negative for rash  Musculoskeletal:negative for arthralgias and myalgias  Neurological: negative for dizziness, headaches and paresthesia      OBJECTIVE:   /66 (BP Location: Left arm, Patient Position: Sitting, Cuff Size: Adult Regular)   Pulse 62   Temp 97.9  F (36.6  C) (Oral)   Resp 16   Ht 1.702 m (5' 7\")   Wt 60.9 kg (134 lb 4.8 oz)   SpO2 99%   BMI 21.03 kg/m      Physical Exam  General appearance - alert, well appearing, and in no distress  Mental status - normal mood, behavior, speech, dress, motor activity, and thought processes  Eyes - pupils equal and reactive, extraocular eye movements intact, funduscopic exam normal, discs flat and sharp  Ears - bilateral TM's and external ear canals normal  Mouth - mucous membranes moist, pharynx normal without lesions  Neck - supple, no significant adenopathy, carotids upstroke normal bilaterally, no bruits, thyroid exam: " thyroid is normal in size without nodules or tenderness  Chest - clear to auscultation, no wheezes, rales or rhonchi, symmetric air entry  Heart - normal rate, regular rhythm, normal S1, S2, no murmurs, rubs, clicks or gallops  Abdomen - soft, nontender, nondistended, no masses or organomegaly  Neurological - alert, oriented, normal speech, no focal findings or movement disorder noted, DTR's normal and symmetric  Extremities - peripheral pulses normal, no pedal edema, no clubbing or cyanosis  Skin - no rashes or worrisome lesions

## 2022-05-18 LAB — HCV AB SERPL QL IA: NONREACTIVE

## 2022-09-18 ENCOUNTER — HEALTH MAINTENANCE LETTER (OUTPATIENT)
Age: 32
End: 2022-09-18

## 2023-04-20 ENCOUNTER — PATIENT OUTREACH (OUTPATIENT)
Dept: CARE COORDINATION | Facility: CLINIC | Age: 33
End: 2023-04-20
Payer: COMMERCIAL

## 2023-05-04 ENCOUNTER — PATIENT OUTREACH (OUTPATIENT)
Dept: CARE COORDINATION | Facility: CLINIC | Age: 33
End: 2023-05-04
Payer: COMMERCIAL

## 2023-07-30 ENCOUNTER — HEALTH MAINTENANCE LETTER (OUTPATIENT)
Age: 33
End: 2023-07-30

## 2024-09-22 ENCOUNTER — HEALTH MAINTENANCE LETTER (OUTPATIENT)
Age: 34
End: 2024-09-22

## 2025-06-26 SDOH — HEALTH STABILITY: PHYSICAL HEALTH: ON AVERAGE, HOW MANY DAYS PER WEEK DO YOU ENGAGE IN MODERATE TO STRENUOUS EXERCISE (LIKE A BRISK WALK)?: 3 DAYS

## 2025-06-26 SDOH — HEALTH STABILITY: PHYSICAL HEALTH: ON AVERAGE, HOW MANY MINUTES DO YOU ENGAGE IN EXERCISE AT THIS LEVEL?: 30 MIN

## 2025-06-26 ASSESSMENT — SOCIAL DETERMINANTS OF HEALTH (SDOH): HOW OFTEN DO YOU GET TOGETHER WITH FRIENDS OR RELATIVES?: NEVER

## 2025-06-27 ENCOUNTER — OFFICE VISIT (OUTPATIENT)
Dept: FAMILY MEDICINE | Facility: CLINIC | Age: 35
End: 2025-06-27
Payer: COMMERCIAL

## 2025-06-27 VITALS
DIASTOLIC BLOOD PRESSURE: 70 MMHG | SYSTOLIC BLOOD PRESSURE: 114 MMHG | HEART RATE: 73 BPM | HEIGHT: 68 IN | RESPIRATION RATE: 12 BRPM | OXYGEN SATURATION: 98 % | BODY MASS INDEX: 21.82 KG/M2 | WEIGHT: 144 LBS | TEMPERATURE: 98.6 F

## 2025-06-27 DIAGNOSIS — Z00.00 ROUTINE GENERAL MEDICAL EXAMINATION AT A HEALTH CARE FACILITY: Primary | ICD-10-CM

## 2025-06-27 DIAGNOSIS — Z13.220 LIPID SCREENING: ICD-10-CM

## 2025-06-27 DIAGNOSIS — Z13.1 SCREENING FOR DIABETES MELLITUS: ICD-10-CM

## 2025-06-27 PROCEDURE — 3074F SYST BP LT 130 MM HG: CPT | Performed by: FAMILY MEDICINE

## 2025-06-27 PROCEDURE — 1126F AMNT PAIN NOTED NONE PRSNT: CPT | Performed by: FAMILY MEDICINE

## 2025-06-27 PROCEDURE — 99385 PREV VISIT NEW AGE 18-39: CPT | Performed by: FAMILY MEDICINE

## 2025-06-27 PROCEDURE — 3078F DIAST BP <80 MM HG: CPT | Performed by: FAMILY MEDICINE

## 2025-06-27 ASSESSMENT — ANXIETY QUESTIONNAIRES
GAD7 TOTAL SCORE: 1
7. FEELING AFRAID AS IF SOMETHING AWFUL MIGHT HAPPEN: NOT AT ALL
5. BEING SO RESTLESS THAT IT IS HARD TO SIT STILL: NOT AT ALL
IF YOU CHECKED OFF ANY PROBLEMS ON THIS QUESTIONNAIRE, HOW DIFFICULT HAVE THESE PROBLEMS MADE IT FOR YOU TO DO YOUR WORK, TAKE CARE OF THINGS AT HOME, OR GET ALONG WITH OTHER PEOPLE: NOT DIFFICULT AT ALL
3. WORRYING TOO MUCH ABOUT DIFFERENT THINGS: NOT AT ALL
1. FEELING NERVOUS, ANXIOUS, OR ON EDGE: SEVERAL DAYS
7. FEELING AFRAID AS IF SOMETHING AWFUL MIGHT HAPPEN: NOT AT ALL
6. BECOMING EASILY ANNOYED OR IRRITABLE: NOT AT ALL
GAD7 TOTAL SCORE: 1
2. NOT BEING ABLE TO STOP OR CONTROL WORRYING: NOT AT ALL
GAD7 TOTAL SCORE: 1
8. IF YOU CHECKED OFF ANY PROBLEMS, HOW DIFFICULT HAVE THESE MADE IT FOR YOU TO DO YOUR WORK, TAKE CARE OF THINGS AT HOME, OR GET ALONG WITH OTHER PEOPLE?: NOT DIFFICULT AT ALL
4. TROUBLE RELAXING: NOT AT ALL

## 2025-06-27 ASSESSMENT — PAIN SCALES - GENERAL: PAINLEVEL_OUTOF10: NO PAIN (0)

## 2025-06-27 NOTE — PROGRESS NOTES
Preventive Care Visit  [facility]  Kesha Silva MD, Family Medicine  Jun 27, 2025      Assessment & Plan     Routine general medical examination at a health care facility  Well man, up-to-date on preventative screenings given below    --    Bimal will make a fasting lab only visit in the future for    Screening for diabetes mellitus  - Glucose    Lipid screening  - Lipid panel reflex to direct LDL Fasting    Return in about 1 year (around 6/27/2026) for Routine preventive, or earlier as needed.        Counseling  Appropriate preventive services were addressed with this patient via screening, questionnaire, or discussion for nutrition, physical activity.       No counseling necessary for: fall prevention, Tobacco-use cessation, social engagement, weight loss and cognition.    Checklist reviewing preventive services available has been given to the patient.  Reviewed patient's diet, addressing concerns and/or questions.     He is at risk for lack of exercise and has been provided with information to increase physical activity for the benefit of his well-being.     Patient is at risk for social isolation and has been provided with information about the benefit of social connection.   The patient was instructed to see the dentist every 6 months.         Subjective   Bimal is a 35 year old, presenting for the following:  Annual Visit (Annual preventative check up. Bumps on head/scalp, noticed first 6 months + ago, not itchy or painful.   Also concerned about spine curvature/posture.)        6/27/2025     1:34 PM   Additional Questions   Roomed by wilfredo TABOR   Accompanied by charles ALLISON  From last visit 5/17/22, in italics, with today's follow up in regular typeface     History of depression - feeling much better now. Drinking less. Met current partner during pandemic  - this brings him happiness, they are living together - just had 2 year anniversary 2 weeks ago    Now engaged, will get   next September, and just bought a house. Two bedroom duplex to house in [place]    Depression nota an issue, but eery winter it pops up for a couple of day     BRBPR: a month and a half ago he was going to the bathroom and there was a lot of blood with bowel movement.  Didn't hurt and it was dripping. Occurred two days, then went away.  No issues since, no bleeding.  Takes the target version of metamucil occasionally.  Stools ave been on the softer side - and has to push harder because of that.  No family history of colon or rectal issues. Mom had colon polyps - not a concern. No abdominal pain or weight loss    Resolved spontaneously - fiber helped    --     Other concerns  - Over the past couple of months bumps on the back of his head. They don't itch - if he scratch they are tending   - Dad has diabetes    Preventive:  - [facility].  It is mandatory for his job   -He also [facility]  [facility] Covid vaccine at the [facility] every year in October or November -mandatory       Social History: Bimal is a still a  at the [facility]   - one looking at metastatic prostate cancer intervention   - colonoscopy follow-up times   - wildlife conservation      Advance Care Planning    Discussed advance care planning with patient; informed [facility] has link to [employer]..honoring [employer] document provided and discussed         6/26/2025   General Health   How would you rate your overall physical health? Good   Feel stress (tense, anxious, or unable to sleep) Not at all         6/26/2025   Nutrition   Three or more servings of calcium each day? (!) NO   Diet: Regular (no restrictions)    Breakfast skipped   How many servings of fruit and vegetables per day? (!) 0-1   How many sweetened beverages each day? 0-1    Going to try to cook more     Multiple values from one day are sorted in reverse-chronological order         6/26/2025   Exercise    Days per week of moderate/strenuous exercise 3 days   Average minutes spent exercising at this level 30 min   On and off - will have phases where he works out, then something will come up  3-4 days a week of medium to high intensity cardio      6/26/2025   Social Factors   Frequency of gathering with friends or relatives Never   Worry food won't last until get money to buy more No   Food not last or not have enough money for food? No   Do you have housing? (Housing is defined as stable permanent housing and does not include staying outside in a car, in a tent, in an abandoned building, in an overnight shelter, or couch-surfing.) Yes   Are you worried about losing your housing? No   Lack of transportation? No   Unable to get utilities (heat,electricity)? No   (!) SOCIAL CONNECTIONS CONCERN      6/26/2025   Dental   Dentist two times every year? (!) NO         Today's PHQ-2 Score:       6/26/2025    11:51 PM   PHQ-2 ( 1999 Pfizer)   Q1: Little interest or pleasure in doing things 0   Q2: Feeling down, depressed or hopeless 0   PHQ-2 Score 0    Q1: Little interest or pleasure in doing things Not at all   Q2: Feeling down, depressed or hopeless Not at all   PHQ-2 Score 0       Patient-reported           6/26/2025   Substance Use   Alcohol more than 3/day or more than 7/wk No   Do you use any other substances recreationally? No     Social History     Tobacco Use    Smoking status: Never    Smokeless tobacco: Never   Substance Use Topics    Alcohol use: Yes    Drug use: No           6/26/2025   STI Screening   New sexual partner(s) since last STI/HIV test? No         6/26/2025   Contraception/Family Planning   Questions about contraception or family planning No -partner uses contraceptive pill        Reviewed and updated as needed this visit by Provider       Objective    Exam  /70 (BP Location: Left arm, Patient Position: Sitting, Cuff Size: Adult Regular)   Pulse 73   Temp 98.6  F (37  C) (Temporal)   Resp 12  "  Ht 1.735 m (5' 8.31\")   Wt 65.3 kg (144 lb)   SpO2 98%   BMI 21.70 kg/m     Estimated body mass index is 21.7 kg/m  as calculated from the following:    Height as of this encounter: 1.735 m (5' 8.31\").    Weight as of this encounter: 65.3 kg (144 lb).    Physical Exam  General appearance - alert, well appearing, and in no distress  Mental status - normal mood, behavior, speech, dress, motor activity, and thought processes  Eyes - pupils equal and reactive, extraocular eye movements intact, funduscopic exam normal, discs flat and sharp  Ears - bilateral TM's and external ear canals normal  Mouth - mucous membranes moist, pharynx normal without lesions  Neck - supple, no significant adenopathy, carotids upstroke normal bilaterally, no bruits, thyroid exam: thyroid is normal in size without nodules or tenderness  Chest - clear to auscultation, no wheezes, rales or rhonchi, symmetric air entry  Heart - normal rate, regular rhythm, normal S1, S2, no murmurs, rubs, clicks or gallops  Abdomen - soft, nontender, nondistended, no masses or organomegaly  Neurological - alert, oriented, normal speech, no focal findings or movement disorder noted, DTR's normal and symmetric  Extremities - peripheral pulses normal, no pedal edema, no clubbing or cyanosis  Skin - no rashes or worrisome lesions.  There is a approximately 2 to 3 mm papule versus cyst at the back of his head in hair bearing area of the scalp.  This appears benign          Signed Electronically by: Kesha Silva MD    Answers submitted by the patient for this visit:  Patient Health Questionnaire (G7) (Submitted on 6/27/2025)  HARPER 7 TOTAL SCORE: 1    "

## 2025-06-28 NOTE — PATIENT INSTRUCTIONS
Patient Education   Preventive Care Advice   This is general advice given by our system to help you stay healthy. However, your care team may have specific advice just for you. Please talk to your care team about your preventive care needs.  Nutrition  Eat 5 or more servings of fruits and vegetables each day.  Try wheat bread, brown rice and whole grain pasta (instead of white bread, rice, and pasta).  Get enough calcium and vitamin D. Check the label on foods and aim for 100% of the RDA (recommended daily allowance).  Lifestyle  Exercise at least 150 minutes each week  (30 minutes a day, 5 days a week).  Do muscle strengthening activities 2 days a week. These help control your weight and prevent disease.  No smoking.  Wear sunscreen to prevent skin cancer.  Have a dental exam and cleaning every 6 months.  Yearly exams  See your health care team every year to talk about:  Any changes in your health.  Any medicines your care team has prescribed.  Preventive care, family planning, and ways to prevent chronic diseases.  Shots (vaccines)   HPV shots (up to age 26), if you've never had them before.  Hepatitis B shots (up to age 59), if you've never had them before.  COVID-19 shot: Get this shot when it's due.  Flu shot: Get a flu shot every year.  Tetanus shot: Get a tetanus shot every 10 years.  Pneumococcal, hepatitis A, and RSV shots: Ask your care team if you need these based on your risk.  Shingles shot (for age 50 and up)  General health tests  Diabetes screening:  Starting at age 35, Get screened for diabetes at least every 3 years.  If you are younger than age 35, ask your care team if you should be screened for diabetes.  Cholesterol test: At age 39, start having a cholesterol test every 5 years, or more often if advised.  Bone density scan (DEXA): At age 50, ask your care team if you should have this scan for osteoporosis (brittle bones).  Hepatitis C: Get tested at least once in your life.  STIs (sexually  transmitted infections)  Before age 24: Ask your care team if you should be screened for STIs.  After age 24: Get screened for STIs if you're at risk. You are at risk for STIs (including HIV) if:  You are sexually active with more than one person.  You don't use condoms every time.  You or a partner was diagnosed with a sexually transmitted infection.  If you are at risk for HIV, ask about PrEP medicine to prevent HIV.  Get tested for HIV at least once in your life, whether you are at risk for HIV or not.  Cancer screening tests  Cervical cancer screening: If you have a cervix, begin getting regular cervical cancer screening tests starting at age 21.  Breast cancer scan (mammogram): If you've ever had breasts, begin having regular mammograms starting at age 40. This is a scan to check for breast cancer.  Colon cancer screening: It is important to start screening for colon cancer at age 45.  Have a colonoscopy test every 10 years (or more often if you're at risk) Or, ask your provider about stool tests like a FIT test every year or Cologuard test every 3 years.  To learn more about your testing options, visit:   .  For help making a decision, visit:   https://bit.ly/yi64917.  Prostate cancer screening test: If you have a prostate, ask your care team if a prostate cancer screening test (PSA) at age 55 is right for you.  Lung cancer screening: If you are a current or former smoker ages 50 to 80, ask your care team if ongoing lung cancer screenings are right for you.  For informational purposes only. Not to replace the advice of your health care provider. Copyright   2023 [facility]. All rights reserved. Clinically reviewed by the [facility] Transitions Program. [employer] [id number] - REV 01/24.  Relationships for Good Health  Relationships are important for our health and happiness. Social isolation, loneliness and lack of support are bad for your health. Studies show that loneliness can harm health  and limit your life span as much as high blood pressure and smoking.   Take some time to reflect on your relationships. Then answer these questions:  Are there people in your life that cause you stress or drain your energy? What can you do to set limits?  ________________________________________________________________________________________________________________________________________________________________________________________________________________________________________________________________________________________________________________________________________________  Who do you enjoy spending time with? Who can you go to for support?  ________________________________________________________________________________________________________________________________________________________________________________________________________________________________________________________________________________________________________________________________________________  What can you do to improve your relationships with others?  __________________________________________________________________________________________________________________________________________________________________________________________________________________  ______________________________________________________________________________________________________________________________  What do you like most about your relationships with others?  ________________________________________________________________________________________________________________________________________________________________________________________________________________________________________________________________________________________________________________________________________________  My goal: ______________________________________________________________________  I will:  ______________________________________________________________________________________________________________________________________________________________________________________________    For informational purposes only. Not to replace the advice of your health care provider. Copyright   2018 [facility]. All rights reserved. Clinically reviewed by [facility]  [facility]. [employer] [id number] - Rev 06/24.